# Patient Record
Sex: MALE | Race: WHITE | Employment: FULL TIME | URBAN - NONMETROPOLITAN AREA
[De-identification: names, ages, dates, MRNs, and addresses within clinical notes are randomized per-mention and may not be internally consistent; named-entity substitution may affect disease eponyms.]

---

## 2018-07-16 PROBLEM — N20.0 RENAL STONE: Status: ACTIVE | Noted: 2018-07-16

## 2022-03-18 PROBLEM — N20.0 RENAL STONE: Status: ACTIVE | Noted: 2018-07-16

## 2022-06-19 ENCOUNTER — APPOINTMENT (OUTPATIENT)
Dept: GENERAL RADIOLOGY | Age: 42
End: 2022-06-19
Attending: INTERNAL MEDICINE
Payer: COMMERCIAL

## 2022-06-19 ENCOUNTER — HOSPITAL ENCOUNTER (EMERGENCY)
Age: 42
Discharge: HOME | End: 2022-06-19
Attending: INTERNAL MEDICINE
Payer: COMMERCIAL

## 2022-06-19 VITALS
HEIGHT: 70 IN | HEART RATE: 105 BPM | OXYGEN SATURATION: 100 % | BODY MASS INDEX: 35.66 KG/M2 | TEMPERATURE: 98.1 F | RESPIRATION RATE: 18 BRPM | WEIGHT: 249.12 LBS | SYSTOLIC BLOOD PRESSURE: 129 MMHG | DIASTOLIC BLOOD PRESSURE: 65 MMHG

## 2022-06-19 DIAGNOSIS — J06.9 VIRAL UPPER RESPIRATORY TRACT INFECTION: Primary | ICD-10-CM

## 2022-06-19 DIAGNOSIS — J98.01 ACUTE BRONCHOSPASM: ICD-10-CM

## 2022-06-19 LAB
DEPRECATED S PYO AG THROAT QL EIA: NEGATIVE
FLUAV AG NPH QL IA: NEGATIVE
FLUBV AG NOSE QL IA: NEGATIVE

## 2022-06-19 PROCEDURE — 87147 CULTURE TYPE IMMUNOLOGIC: CPT

## 2022-06-19 PROCEDURE — 87804 INFLUENZA ASSAY W/OPTIC: CPT

## 2022-06-19 PROCEDURE — 99284 EMERGENCY DEPT VISIT MOD MDM: CPT

## 2022-06-19 PROCEDURE — 87070 CULTURE OTHR SPECIMN AEROBIC: CPT

## 2022-06-19 PROCEDURE — 74011000250 HC RX REV CODE- 250: Performed by: INTERNAL MEDICINE

## 2022-06-19 PROCEDURE — 87880 STREP A ASSAY W/OPTIC: CPT

## 2022-06-19 PROCEDURE — 71045 X-RAY EXAM CHEST 1 VIEW: CPT

## 2022-06-19 PROCEDURE — 74011250637 HC RX REV CODE- 250/637: Performed by: INTERNAL MEDICINE

## 2022-06-19 PROCEDURE — 94640 AIRWAY INHALATION TREATMENT: CPT

## 2022-06-19 RX ORDER — DEXAMETHASONE SODIUM PHOSPHATE 4 MG/ML
10 INJECTION, SOLUTION INTRA-ARTICULAR; INTRALESIONAL; INTRAMUSCULAR; INTRAVENOUS; SOFT TISSUE
Status: COMPLETED | OUTPATIENT
Start: 2022-06-19 | End: 2022-06-19

## 2022-06-19 RX ORDER — DEXTROMETHORPHAN POLISTIREX 30 MG/5 ML
SUSPENSION, EXTENDED RELEASE 12 HR ORAL AS NEEDED
Status: DISCONTINUED | OUTPATIENT
Start: 2022-06-19 | End: 2022-06-19 | Stop reason: CLARIF

## 2022-06-19 RX ORDER — CETIRIZINE HCL 10 MG
TABLET ORAL
Qty: 15 TABLET | Refills: 0 | Status: SHIPPED | OUTPATIENT
Start: 2022-06-19

## 2022-06-19 RX ORDER — DULOXETIN HYDROCHLORIDE 30 MG/1
CAPSULE, DELAYED RELEASE ORAL
COMMUNITY
Start: 2022-06-11

## 2022-06-19 RX ORDER — CARIPRAZINE 4.5 MG/1
4.5 CAPSULE, GELATIN COATED ORAL DAILY
COMMUNITY
Start: 2022-06-11

## 2022-06-19 RX ORDER — PREDNISONE 20 MG/1
20 TABLET ORAL DAILY
Qty: 5 TABLET | Refills: 0 | Status: SHIPPED | OUTPATIENT
Start: 2022-06-19 | End: 2022-06-24

## 2022-06-19 RX ORDER — ALBUTEROL SULFATE 0.83 MG/ML
2.5 SOLUTION RESPIRATORY (INHALATION)
Status: COMPLETED | OUTPATIENT
Start: 2022-06-19 | End: 2022-06-19

## 2022-06-19 RX ORDER — GUSELKUMAB 100 MG/ML
INJECTION SUBCUTANEOUS
COMMUNITY

## 2022-06-19 RX ORDER — TRAZODONE HYDROCHLORIDE 100 MG/1
TABLET ORAL
COMMUNITY
Start: 2022-06-11

## 2022-06-19 RX ORDER — ALBUTEROL SULFATE 90 UG/1
2 AEROSOL, METERED RESPIRATORY (INHALATION)
Qty: 1 EACH | Refills: 0 | Status: SHIPPED | OUTPATIENT
Start: 2022-06-19

## 2022-06-19 RX ORDER — ALPRAZOLAM 0.5 MG/1
0.5 TABLET ORAL DAILY
COMMUNITY
Start: 2022-06-11

## 2022-06-19 RX ORDER — IPRATROPIUM BROMIDE AND ALBUTEROL SULFATE 2.5; .5 MG/3ML; MG/3ML
3 SOLUTION RESPIRATORY (INHALATION)
Status: COMPLETED | OUTPATIENT
Start: 2022-06-19 | End: 2022-06-19

## 2022-06-19 RX ADMIN — ALBUTEROL SULFATE 2.5 MG: 2.5 SOLUTION RESPIRATORY (INHALATION) at 08:47

## 2022-06-19 RX ADMIN — DEXAMETHASONE SODIUM PHOSPHATE 10 MG: 4 INJECTION, SOLUTION INTRAMUSCULAR; INTRAVENOUS at 08:03

## 2022-06-19 RX ADMIN — IPRATROPIUM BROMIDE AND ALBUTEROL SULFATE 3 ML: .5; 2.5 SOLUTION RESPIRATORY (INHALATION) at 07:58

## 2022-06-19 NOTE — ED PROVIDER NOTES
EMERGENCY DEPARTMENT HISTORY AND PHYSICAL EXAM      Date: 6/19/2022  Patient Name: Letty Lynne      History of Presenting Illness     Chief Complaint   Patient presents with    Flu Like Symptoms       History Provided By: Patient    HPI: Letty Lynne, 38 y.o. male with a past medical history significant for bipolar; kidney stone; spina bifida; CHRISTIANO that presents to the ED with cc of URI sx for past 3 d with cough; runny nose; sinus congestion; wheezing. Pt vapes. Denies fever; chills; hemoptysis; n/v. There are no other complaints, changes, or physical findings at this time. PCP: Lurdes Mark NP    Current Outpatient Medications   Medication Sig Dispense Refill    albuterol (PROVENTIL HFA, VENTOLIN HFA, PROAIR HFA) 90 mcg/actuation inhaler Take 2 Puffs by inhalation every four (4) hours as needed for Wheezing. 1 Each 0    predniSONE (DELTASONE) 20 mg tablet Take 1 Tablet by mouth daily for 5 days. With Breakfast 5 Tablet 0    cetirizine (ZyrTEC) 10 mg tablet Take one tablets by mouth daily for 7 days and then as needed after that. Restart forseven days if sinus congestion recurs. 15 Tablet 0    guaiFENesin-dextromethorphan SR (Mucinex DM) 600-30 mg per tablet Take 1 Tablet by mouth two (2) times a day. 20 Tablet 0    traZODone (DESYREL) 100 mg tablet TAKE 1 TO 2 TABLETS BY MOUTH AT BEDTIME AS NEEDED FOR INSOMNIA      DULoxetine (CYMBALTA) 30 mg capsule TAKE 1 CAPSULE BY MOUTH IN THE MORNING      Vraylar 4.5 mg capsule Take 4.5 mg by mouth daily.  guselkumab (Tremfya) 100 mg/mL atIn INJECT 1 PEN UNDER THE SKIN EVERY 8 WEEKS.  ALPRAZolam (XANAX) 0.5 mg tablet Take 0.5 mg by mouth daily.          Past History     Past Medical History:  Past Medical History:   Diagnosis Date    Arthritis     Bipolar 1 disorder ([address]     Kidney stone     Psychiatric disorder     BIPOLAR    Sleep apnea     Sleep apnea     Spina bifida [facility])        Past Surgical History:  Past Surgical History:   Procedure Laterality Date    HX ORTHOPAEDIC  2013    [facility] -SPINE       Family History:  History reviewed. No pertinent family history. Social History:  Social History     Tobacco Use    Smoking status: Former Smoker     Types: Cigarettes     Quit date: 2013     Years since quittin.2    Smokeless tobacco: Never Used   Vaping Use    Vaping Use: Not on file   Substance Use Topics    Alcohol use: No     Alcohol/week: 0.0 standard drinks    Drug use: Yes     Types: Marijuana, Prescription       Allergies: Allergies   Allergen Reactions    Nsaids (Non-Steroidal Anti-Inflammatory Drug) Other (comments)     Swelling in the feet and hands         Review of Systems     Review of Systems   Constitutional: Negative for chills and fever. HENT: Positive for rhinorrhea and sinus pressure. Negative for sore throat. Eyes: Negative for redness. Respiratory: Positive for cough and wheezing. Negative for shortness of breath. Cardiovascular: Negative for chest pain. Gastrointestinal: Negative for abdominal pain, diarrhea, nausea and vomiting. Genitourinary: Negative for dysuria and flank pain. Musculoskeletal: Negative for arthralgias, myalgias and neck stiffness. Skin: Negative for rash. Neurological: Negative for headaches. Psychiatric/Behavioral: Negative for confusion. Physical Exam     Physical Exam  Vitals and nursing note reviewed. Constitutional:       General: He is not in acute distress. Appearance: Normal appearance. He is not ill-appearing or diaphoretic. HENT:      Head: Normocephalic. Nose: Congestion present. Mouth/Throat:      Pharynx: Oropharynx is clear. No oropharyngeal exudate or posterior oropharyngeal erythema. Eyes:      Conjunctiva/sclera: Conjunctivae normal.      Pupils: Pupils are equal, round, and reactive to light. Cardiovascular:      Rate and Rhythm: Regular rhythm. Pulses: Normal pulses.       Heart sounds: Normal heart sounds. No murmur heard. Pulmonary:      Effort: Pulmonary effort is normal. No respiratory distress. Breath sounds: Wheezing present. Abdominal:      Palpations: Abdomen is soft. Tenderness: There is no abdominal tenderness. There is no guarding or rebound. Musculoskeletal:         General: Normal range of motion. Cervical back: Neck supple. Skin:     General: Skin is warm. Neurological:      Mental Status: He is alert and oriented to person, place, and time. Psychiatric:         Behavior: Behavior normal.         Lab and Diagnostic Study Results     Labs -     Recent Results (from the past 12 hour(s))   INFLUENZA A & B AG (RAPID TEST)    Collection Time: 06/19/22  7:44 AM   Result Value Ref Range    Influenza A Antigen Negative Negative      Influenza B Antigen Negative Negative     STREP AG SCREEN, GROUP A    Collection Time: 06/19/22  7:44 AM    Specimen: Throat   Result Value Ref Range    Group A Strep Ag ID Negative         Radiologic Studies -   [unfilled]  CT Results  (Last 48 hours)    None        CXR Results  (Last 48 hours)    None          Medical Decision Making and ED Course   - I am the first and primary provider for this patient AND AM THE PRIMARY PROVIDER OF RECORD. - I reviewed the vital signs, available nursing notes, past medical history, past surgical history, family history and social history. - Initial assessment performed. The patients presenting problems have been discussed, and the staff are in agreement with the care plan formulated and outlined with them. I have encouraged them to ask questions as they arise throughout their visit. Vital Signs-Reviewed the patient's vital signs. Patient Vitals for the past 12 hrs:   Temp Pulse Resp BP SpO2   06/19/22 0848 -- -- -- -- 100 %   06/19/22 0820 -- -- -- -- 98 %   06/19/22 0748 -- -- -- -- 98 %   06/19/22 0742 98.1 °F (36.7 °C) 80 18 (!) 160/94 98 %           Records Reviewed:  Old Medical Records    ED Course:   DDX:  Including, but not limited to : URI, LRTI, viral syndrome, RAD, bronchospasm, influenza, seasonal allergies; pneumonia; and GERD. Consultations:       Consultations:      Procedures and Critical Care           Disposition     Disposition: Discharge    Remove if not discharged  DISCHARGE PLAN:  1. Current Discharge Medication List      CONTINUE these medications which have NOT CHANGED    Details   traZODone (DESYREL) 100 mg tablet TAKE 1 TO 2 TABLETS BY MOUTH AT BEDTIME AS NEEDED FOR INSOMNIA      DULoxetine (CYMBALTA) 30 mg capsule TAKE 1 CAPSULE BY MOUTH IN THE MORNING      Vraylar 4.5 mg capsule Take 4.5 mg by mouth daily. guselkumab (Tremfya) 100 mg/mL atIn INJECT 1 PEN UNDER THE SKIN EVERY 8 WEEKS. ALPRAZolam (XANAX) 0.5 mg tablet Take 0.5 mg by mouth daily. 2.   Follow-up Information     Follow up With Specialties Details Why Contact Info    Otto Bradford MD Family Medicine Schedule an appointment as soon as possible for a visit in 1 week  [address] [facility]  [address] [place] [address]  [phone]          3. Return to ED if worse   4. Current Discharge Medication List      START taking these medications    Details   albuterol (PROVENTIL HFA, VENTOLIN HFA, PROAIR HFA) 90 mcg/actuation inhaler Take 2 Puffs by inhalation every four (4) hours as needed for Wheezing. Qty: 1 Each, Refills: 0  Start date: 6/19/2022      predniSONE (DELTASONE) 20 mg tablet Take 1 Tablet by mouth daily for 5 days. With Breakfast  Qty: 5 Tablet, Refills: 0  Start date: 6/19/2022, End date: 6/24/2022      cetirizine (ZyrTEC) 10 mg tablet Take one tablets by mouth daily for 7 days and then as needed after that. Restart forseven days if sinus congestion recurs. Qty: 15 Tablet, Refills: 0  Start date: 6/19/2022      guaiFENesin-dextromethorphan SR (Mucinex DM) 600-30 mg per tablet Take 1 Tablet by mouth two (2) times a day.   Qty: 20 Tablet, Refills: 0  Start date: 6/19/2022             Diagnosis     Clinical Impression:   1. Viral upper respiratory tract infection    2. Acute bronchospasm        Attestations:    Ronnie Mcgill MD    Please note that this dictation was completed with [employer], the computer voice recognition software. Quite often unanticipated grammatical, syntax, homophones, and other interpretive errors are inadvertently transcribed by the computer software. Please disregard these errors. Please excuse any errors that have escaped final proofreading. Thank you.

## 2022-06-22 LAB
BACTERIA SPEC CULT: NORMAL
BACTERIA SPEC CULT: NORMAL
SPECIAL REQUESTS,SREQ: NORMAL

## 2023-04-27 ENCOUNTER — HOSPITAL ENCOUNTER (EMERGENCY)
Age: 43
Discharge: HOME | End: 2023-04-27
Attending: EMERGENCY MEDICINE
Payer: COMMERCIAL

## 2023-04-27 ENCOUNTER — APPOINTMENT (OUTPATIENT)
Age: 43
End: 2023-04-27
Payer: COMMERCIAL

## 2023-04-27 VITALS
WEIGHT: 255.3 LBS | DIASTOLIC BLOOD PRESSURE: 97 MMHG | TEMPERATURE: 97.8 F | OXYGEN SATURATION: 96 % | SYSTOLIC BLOOD PRESSURE: 151 MMHG | RESPIRATION RATE: 18 BRPM | HEART RATE: 72 BPM | HEIGHT: 69 IN | BODY MASS INDEX: 37.81 KG/M2

## 2023-04-27 DIAGNOSIS — M54.50 ACUTE EXACERBATION OF CHRONIC LOW BACK PAIN: Primary | ICD-10-CM

## 2023-04-27 DIAGNOSIS — G89.29 ACUTE EXACERBATION OF CHRONIC LOW BACK PAIN: Primary | ICD-10-CM

## 2023-04-27 PROCEDURE — 99283 EMERGENCY DEPT VISIT LOW MDM: CPT

## 2023-04-27 PROCEDURE — 72100 X-RAY EXAM L-S SPINE 2/3 VWS: CPT

## 2023-04-27 RX ORDER — METHYLPREDNISOLONE 4 MG/1
TABLET ORAL
Qty: 1 KIT | Refills: 0 | Status: SHIPPED | OUTPATIENT
Start: 2023-04-27 | End: 2023-05-03

## 2023-04-27 RX ORDER — HYDROCODONE BITARTRATE AND ACETAMINOPHEN 7.5; 325 MG/1; MG/1
1 TABLET ORAL EVERY 6 HOURS PRN
Qty: 5 TABLET | Refills: 0 | Status: SHIPPED | OUTPATIENT
Start: 2023-04-27 | End: 2023-04-30

## 2023-04-27 ASSESSMENT — PAIN DESCRIPTION - LOCATION: LOCATION: BACK

## 2023-04-27 ASSESSMENT — PAIN DESCRIPTION - DESCRIPTORS: DESCRIPTORS: ACHING;SHARP

## 2023-04-27 ASSESSMENT — PAIN - FUNCTIONAL ASSESSMENT
PAIN_FUNCTIONAL_ASSESSMENT: 0-10
PAIN_FUNCTIONAL_ASSESSMENT: 0-10

## 2023-04-27 ASSESSMENT — PAIN SCALES - GENERAL
PAINLEVEL_OUTOF10: 4
PAINLEVEL_OUTOF10: 5

## 2023-04-27 ASSESSMENT — PAIN DESCRIPTION - ORIENTATION: ORIENTATION: LOWER

## 2023-04-27 NOTE — ED NOTES
Discharge instructions reviewed with patient. Patient verbalized understanding. Patient advised to follow up as directed on discharge instructions. Patient denies questions, needs or concerns at this time. Patient verbalized understanding. No s/sx of distress noted.        Gisela Dave RN  04/27/23 2755

## 2023-04-27 NOTE — ED TRIAGE NOTES
Patient reports hx of spina bifida. States his lower back started bothering him yesterday. Patient states he tried taking Tylenol yesterday without relief. Pain is worse with movement.

## 2023-04-27 NOTE — ED PROVIDER NOTES
management. REASSESSMENT            FINAL IMPRESSION      1. Acute exacerbation of chronic low back pain          DISPOSITION/PLAN   DISPOSITION Decision To Discharge 04/27/2023 08:13:52 AM      PATIENT REFERRED TO:  No follow-up provider specified. DISCHARGE MEDICATIONS:  Discharge Medication List as of 4/27/2023  8:17 AM        START taking these medications    Details   HYDROcodone-acetaminophen (NORCO) 7.5-325 MG per tablet Take 1 tablet by mouth every 6 hours as needed for Pain for up to 3 days. Do not dispense without steroid. Max Daily Amount: 4 tablets, Disp-5 tablet, R-0Normal      methylPREDNISolone (MEDROL, DELMER,) 4 MG tablet Take by mouth., Disp-1 kit, R-0Normal           Controlled Substances Monitoring:     No flowsheet data found.     (Please note that portions of this note were completed with a voice recognition program.  Efforts were made to edit the dictations but occasionally words are mis-transcribed.)    An Pal MD (electronically signed)  Attending Emergency Physician           Helder Xie MD  04/27/23 5482

## 2023-05-03 ENCOUNTER — OFFICE VISIT (OUTPATIENT)
Facility: CLINIC | Age: 43
End: 2023-05-03
Payer: COMMERCIAL

## 2023-05-03 VITALS
OXYGEN SATURATION: 96 % | HEIGHT: 69 IN | SYSTOLIC BLOOD PRESSURE: 136 MMHG | TEMPERATURE: 98.1 F | RESPIRATION RATE: 18 BRPM | DIASTOLIC BLOOD PRESSURE: 79 MMHG | BODY MASS INDEX: 38.21 KG/M2 | WEIGHT: 258 LBS | HEART RATE: 84 BPM

## 2023-05-03 DIAGNOSIS — E66.09 CLASS 2 OBESITY DUE TO EXCESS CALORIES WITHOUT SERIOUS COMORBIDITY WITH BODY MASS INDEX (BMI) OF 38.0 TO 38.9 IN ADULT: ICD-10-CM

## 2023-05-03 DIAGNOSIS — Z11.4 SCREENING FOR HUMAN IMMUNODEFICIENCY VIRUS WITHOUT PRESENCE OF RISK FACTORS: ICD-10-CM

## 2023-05-03 DIAGNOSIS — Z13.1 SCREENING FOR DIABETES MELLITUS (DM): ICD-10-CM

## 2023-05-03 DIAGNOSIS — M62.838 MUSCLE SPASM: ICD-10-CM

## 2023-05-03 DIAGNOSIS — Z13.29 THYROID DISORDER SCREEN: ICD-10-CM

## 2023-05-03 DIAGNOSIS — Z11.59 NEED FOR HEPATITIS C SCREENING TEST: Primary | ICD-10-CM

## 2023-05-03 DIAGNOSIS — Z13.220 SCREENING, LIPID: ICD-10-CM

## 2023-05-03 PROCEDURE — 99204 OFFICE O/P NEW MOD 45 MIN: CPT | Performed by: FAMILY MEDICINE

## 2023-05-03 RX ORDER — TIZANIDINE 2 MG/1
2 TABLET ORAL 4 TIMES DAILY PRN
Qty: 40 TABLET | Refills: 0 | Status: SHIPPED | OUTPATIENT
Start: 2023-05-03 | End: 2023-05-13

## 2023-05-03 SDOH — ECONOMIC STABILITY: HOUSING INSECURITY
IN THE LAST 12 MONTHS, WAS THERE A TIME WHEN YOU DID NOT HAVE A STEADY PLACE TO SLEEP OR SLEPT IN A SHELTER (INCLUDING NOW)?: NO

## 2023-05-03 SDOH — ECONOMIC STABILITY: FOOD INSECURITY: WITHIN THE PAST 12 MONTHS, THE FOOD YOU BOUGHT JUST DIDN'T LAST AND YOU DIDN'T HAVE MONEY TO GET MORE.: NEVER TRUE

## 2023-05-03 SDOH — ECONOMIC STABILITY: FOOD INSECURITY: WITHIN THE PAST 12 MONTHS, YOU WORRIED THAT YOUR FOOD WOULD RUN OUT BEFORE YOU GOT MONEY TO BUY MORE.: NEVER TRUE

## 2023-05-03 SDOH — ECONOMIC STABILITY: INCOME INSECURITY: HOW HARD IS IT FOR YOU TO PAY FOR THE VERY BASICS LIKE FOOD, HOUSING, MEDICAL CARE, AND HEATING?: NOT HARD AT ALL

## 2023-05-03 ASSESSMENT — PATIENT HEALTH QUESTIONNAIRE - PHQ9
2. FEELING DOWN, DEPRESSED OR HOPELESS: 1
SUM OF ALL RESPONSES TO PHQ9 QUESTIONS 1 & 2: 2
SUM OF ALL RESPONSES TO PHQ QUESTIONS 1-9: 2
1. LITTLE INTEREST OR PLEASURE IN DOING THINGS: 1
SUM OF ALL RESPONSES TO PHQ QUESTIONS 1-9: 2

## 2023-05-03 ASSESSMENT — ENCOUNTER SYMPTOMS: BACK PAIN: 1

## 2023-05-03 NOTE — PROGRESS NOTES
Codi Small is a 37 y.o. male who presents to the office today for the following:  Chief Complaint   Patient presents with    New Patient    Follow-Up from Hospital    Back Pain       Allergies   Allergen Reactions    Nsaids Other (See Comments)     Swelling in the feet and hands         Current Outpatient Medications:     ALPRAZolam (XANAX) 0.5 MG tablet, Take 1 tablet by mouth daily. , Disp: , Rfl:     cariprazine hcl (VRAYLAR) 4.5 MG CAPS capsule, Take 1 capsule by mouth daily, Disp: , Rfl:     DULoxetine (CYMBALTA) 30 MG extended release capsule, TAKE 1 CAPSULE BY MOUTH IN THE MORNING, Disp: , Rfl:     Guselkumab (TREMFYA) 100 MG/ML SOPN, INJECT 1 PEN UNDER THE SKIN EVERY 8 WEEKS., Disp: , Rfl:     traZODone (DESYREL) 100 MG tablet, TAKE 1 TO 2 TABLETS BY MOUTH AT BEDTIME AS NEEDED FOR INSOMNIA, Disp: , Rfl:     methylPREDNISolone (MEDROL, DELMER,) 4 MG tablet, Take by mouth. (Patient not taking: Reported on 5/3/2023), Disp: 1 kit, Rfl: 0    albuterol sulfate HFA (PROVENTIL;VENTOLIN;PROAIR) 108 (90 Base) MCG/ACT inhaler, Inhale 2 puffs into the lungs every 4 hours as needed (Patient not taking: Reported on 4/27/2023), Disp: , Rfl:     cetirizine (ZYRTEC) 10 MG tablet, Take one tablets by mouth daily for 7 days and then as needed after that. Restart forseven days if sinus congestion recurs.  (Patient not taking: Reported on 4/27/2023), Disp: , Rfl:     dextromethorphan-guaiFENesin (MUCINEX DM)  MG per extended release tablet, Take 1 tablet by mouth 2 times daily (Patient not taking: Reported on 4/27/2023), Disp: , Rfl:       Past Medical History:   Diagnosis Date    Arthritis     Bipolar 1 disorder ([facility] [address]     Kidney stone     Psychiatric disorder     BIPOLAR    Sleep apnea     Sleep apnea     Spina bifida [facility])        Past Surgical History:   Procedure Laterality Date    ORTHOPEDIC SURGERY  2013    [facility]AN[facility] BLOCKERS -SPINE       Social History     Socioeconomic History    Marital status:      Spouse

## 2024-04-10 ENCOUNTER — OFFICE VISIT (OUTPATIENT)
Facility: CLINIC | Age: 44
End: 2024-04-10
Payer: COMMERCIAL

## 2024-04-10 VITALS
SYSTOLIC BLOOD PRESSURE: 131 MMHG | BODY MASS INDEX: 37.83 KG/M2 | WEIGHT: 255.4 LBS | HEART RATE: 111 BPM | DIASTOLIC BLOOD PRESSURE: 86 MMHG | TEMPERATURE: 98.1 F | OXYGEN SATURATION: 96 % | RESPIRATION RATE: 16 BRPM | HEIGHT: 69 IN

## 2024-04-10 DIAGNOSIS — G47.33 OBSTRUCTIVE SLEEP APNEA SYNDROME: ICD-10-CM

## 2024-04-10 DIAGNOSIS — E66.09 CLASS 2 OBESITY DUE TO EXCESS CALORIES WITHOUT SERIOUS COMORBIDITY WITH BODY MASS INDEX (BMI) OF 37.0 TO 37.9 IN ADULT: ICD-10-CM

## 2024-04-10 DIAGNOSIS — Z13.1 SCREENING FOR DIABETES MELLITUS: ICD-10-CM

## 2024-04-10 DIAGNOSIS — Z87.891 HISTORY OF TOBACCO USE: ICD-10-CM

## 2024-04-10 DIAGNOSIS — R00.0 TACHYCARDIA: ICD-10-CM

## 2024-04-10 DIAGNOSIS — I10 PRIMARY HYPERTENSION: Primary | ICD-10-CM

## 2024-04-10 DIAGNOSIS — Z13.220 SCREENING FOR CHOLESTEROL LEVEL: ICD-10-CM

## 2024-04-10 PROCEDURE — 3079F DIAST BP 80-89 MM HG: CPT | Performed by: NURSE PRACTITIONER

## 2024-04-10 PROCEDURE — 99203 OFFICE O/P NEW LOW 30 MIN: CPT | Performed by: NURSE PRACTITIONER

## 2024-04-10 PROCEDURE — 3075F SYST BP GE 130 - 139MM HG: CPT | Performed by: NURSE PRACTITIONER

## 2024-04-10 RX ORDER — CLONIDINE HYDROCHLORIDE 0.2 MG/1
0.2 TABLET ORAL NIGHTLY
COMMUNITY

## 2024-04-10 RX ORDER — CLONAZEPAM 2 MG/1
2 TABLET ORAL NIGHTLY PRN
COMMUNITY

## 2024-04-10 ASSESSMENT — PATIENT HEALTH QUESTIONNAIRE - PHQ9
SUM OF ALL RESPONSES TO PHQ QUESTIONS 1-9: 0
2. FEELING DOWN, DEPRESSED OR HOPELESS: NOT AT ALL
SUM OF ALL RESPONSES TO PHQ QUESTIONS 1-9: 0
SUM OF ALL RESPONSES TO PHQ9 QUESTIONS 1 & 2: 0
1. LITTLE INTEREST OR PLEASURE IN DOING THINGS: NOT AT ALL
SUM OF ALL RESPONSES TO PHQ QUESTIONS 1-9: 0
SUM OF ALL RESPONSES TO PHQ QUESTIONS 1-9: 0

## 2024-04-10 NOTE — PROGRESS NOTES
Chief Complaint   Patient presents with    New Patient    Establish Care       \"Have you been to the ER, urgent care clinic since your last visit?  Hospitalized since your last visit?\"    NO    “Have you seen or consulted any other health care providers outside of [facility] since your last visit?”    NO    “Have you had a colorectal cancer screening such as a colonoscopy/FIT/Cologuard?    NO    No colonoscopy on file  No cologuard on file  No FIT/FOBT on file   No flexible sigmoidoscopy on file             
ear pain,postnasal drip, rhinitis,  Eyes:  Negative for visual disturbance,   Respiratory:  Negative for cough and shortness of breath,wheezing ,congestion  Cardiovascular:  Negative for chest pain, palpitations and leg swelling.   Gastrointestinal:  Negative for abdominal pain, constipation, diarrhea, nausea ,vomiting.   Musculoskeletal:  Negative for arthralgias, back pain and gait problem. Joint pain, muscle pain  Skin:  Negative for rash, lesions,  Neurological:  Negative for fainting,dizziness, weakness, headaches,numbness       Current Outpatient Medications   Medication Sig    clonazePAM (KLONOPIN) 2 MG tablet Take 1 tablet by mouth nightly as needed. Max Daily Amount: 2 mg    cloNIDine (CATAPRES) 0.2 MG tablet Take 1 tablet by mouth nightly    cariprazine hcl (VRAYLAR) 4.5 MG CAPS capsule Take 1 capsule by mouth daily    DULoxetine (CYMBALTA) 30 MG extended release capsule TAKE 1 CAPSULE BY MOUTH IN THE MORNING    Guselkumab (TREMFYA) 100 MG/ML SOPN INJECT 1 PEN UNDER THE SKIN EVERY 8 WEEKS.    traZODone (DESYREL) 100 MG tablet TAKE 1 TO 2 TABLETS BY MOUTH AT BEDTIME AS NEEDED FOR INSOMNIA     No current facility-administered medications for this visit.       /86 (Site: Left Upper Arm, Position: Sitting, Cuff Size: Small Adult)   Pulse (!) 111   Temp 98.1 °F (36.7 °C) (Oral)   Resp 16   Ht 1.753 m (5' 9\")   Wt 115.8 kg (255 lb 6.4 oz)   SpO2 96%   BMI 37.72 kg/m²            General:  alert, cooperative, well appearing, in no apparent distress.  Eyes:  Pupils are equally round and reactive to light with accommodation.  The extra-ocular movements are intact.  The lids are without swelling, lesions, or drainage.  The conjunctiva is clear and noninjected.  ENT:    The tongue and mucous membranes are pink and moist without lesions.  The pharynx is non-erythematous without exudates.  Neck:  There is normal range of motion.  The thyroid is normal size without nodules or masses.  There is no

## 2024-04-11 ENCOUNTER — HOSPITAL ENCOUNTER (OUTPATIENT)
Age: 44
End: 2024-04-11
Payer: COMMERCIAL

## 2024-04-11 ENCOUNTER — HOSPITAL ENCOUNTER (OUTPATIENT)
Age: 44
Discharge: HOME | End: 2024-04-11
Payer: COMMERCIAL

## 2024-04-11 DIAGNOSIS — R00.0 TACHYCARDIA: ICD-10-CM

## 2024-04-11 DIAGNOSIS — I10 PRIMARY HYPERTENSION: ICD-10-CM

## 2024-04-11 DIAGNOSIS — Z87.891 HISTORY OF TOBACCO USE: ICD-10-CM

## 2024-04-11 DIAGNOSIS — Z13.1 SCREENING FOR DIABETES MELLITUS: ICD-10-CM

## 2024-04-11 DIAGNOSIS — E66.09 CLASS 2 OBESITY DUE TO EXCESS CALORIES WITHOUT SERIOUS COMORBIDITY WITH BODY MASS INDEX (BMI) OF 37.0 TO 37.9 IN ADULT: ICD-10-CM

## 2024-04-11 DIAGNOSIS — Z13.220 SCREENING FOR CHOLESTEROL LEVEL: ICD-10-CM

## 2024-04-11 LAB
ALBUMIN SERPL-MCNC: 4.3 G/DL (ref 3.4–5)
ALBUMIN/GLOB SERPL: 1.3 (ref 0.8–1.7)
ALP SERPL-CCNC: 73 U/L (ref 45–117)
ALT SERPL-CCNC: 38 U/L (ref 16–61)
ANION GAP SERPL CALC-SCNC: 6 MMOL/L (ref 3–18)
AST SERPL W P-5'-P-CCNC: 21 U/L (ref 10–38)
BILIRUB SERPL-MCNC: 0.9 MG/DL (ref 0.2–1)
BUN SERPL-MCNC: 17 MG/DL (ref 7–18)
BUN/CREAT SERPL: 15 (ref 12–20)
CA-I BLD-MCNC: 9.5 MG/DL (ref 8.5–10.1)
CHLORIDE SERPL-SCNC: 103 MMOL/L (ref 100–111)
CHOLEST SERPL-MCNC: 125 MG/DL
CO2 SERPL-SCNC: 31 MMOL/L (ref 21–32)
CREAT SERPL-MCNC: 1.1 MG/DL (ref 0.6–1.3)
EKG ATRIAL RATE: 86 BPM
EKG DIAGNOSIS: NORMAL
EKG P AXIS: 66 DEGREES
EKG P-R INTERVAL: 162 MS
EKG Q-T INTERVAL: 398 MS
EKG QRS DURATION: 100 MS
EKG QTC CALCULATION (BAZETT): 476 MS
EKG R AXIS: 30 DEGREES
EKG T AXIS: 14 DEGREES
EKG VENTRICULAR RATE: 86 BPM
ERYTHROCYTE [DISTWIDTH] IN BLOOD BY AUTOMATED COUNT: 12.4 % (ref 11.6–14.5)
EST. AVERAGE GLUCOSE BLD GHB EST-MCNC: 114 MG/DL
GLOBULIN SER CALC-MCNC: 3.3 G/DL (ref 2–4)
GLUCOSE SERPL-MCNC: 122 MG/DL (ref 74–99)
HBA1C MFR BLD: 5.6 % (ref 4.2–5.6)
HCT VFR BLD AUTO: 47.4 % (ref 36–48)
HDLC SERPL-MCNC: 40 MG/DL (ref 40–60)
HDLC SERPL: 3.1 (ref 0–5)
HGB BLD-MCNC: 16.1 G/DL (ref 13–16)
LDLC SERPL CALC-MCNC: 60 MG/DL (ref 0–100)
LIPID PANEL: NORMAL
MCH RBC QN AUTO: 29.1 PG (ref 24–34)
MCHC RBC AUTO-ENTMCNC: 34 G/DL (ref 31–37)
MCV RBC AUTO: 85.7 FL (ref 78–100)
NRBC # BLD: 0 K/UL (ref 0–0.01)
NRBC BLD-RTO: 0 PER 100 WBC
PLATELET # BLD AUTO: 235 K/UL (ref 135–420)
PMV BLD AUTO: 9.4 FL (ref 9.2–11.8)
POTASSIUM SERPL-SCNC: 3.9 MMOL/L (ref 3.5–5.5)
PROT SERPL-MCNC: 7.6 G/DL (ref 6.4–8.2)
RBC # BLD AUTO: 5.53 M/UL (ref 4.35–5.65)
SODIUM SERPL-SCNC: 140 MMOL/L (ref 136–145)
TRIGL SERPL-MCNC: 125 MG/DL
TSH SERPL DL<=0.05 MIU/L-ACNC: 2.65 UIU/ML (ref 0.36–3.74)
VLDLC SERPL CALC-MCNC: 25 MG/DL
WBC # BLD AUTO: 7.3 K/UL (ref 4.6–13.2)

## 2024-04-11 PROCEDURE — 84443 ASSAY THYROID STIM HORMONE: CPT

## 2024-04-11 PROCEDURE — 80053 COMPREHEN METABOLIC PANEL: CPT

## 2024-04-11 PROCEDURE — 93005 ELECTROCARDIOGRAM TRACING: CPT

## 2024-04-11 PROCEDURE — 80061 LIPID PANEL: CPT

## 2024-04-11 PROCEDURE — 71046 X-RAY EXAM CHEST 2 VIEWS: CPT

## 2024-04-11 PROCEDURE — 85027 COMPLETE CBC AUTOMATED: CPT

## 2024-04-11 PROCEDURE — 36415 COLL VENOUS BLD VENIPUNCTURE: CPT

## 2024-04-11 PROCEDURE — 83036 HEMOGLOBIN GLYCOSYLATED A1C: CPT

## 2024-04-16 ENCOUNTER — TELEPHONE (OUTPATIENT)
Facility: CLINIC | Age: 44
End: 2024-04-16

## 2024-04-16 NOTE — TELEPHONE ENCOUNTER
Pt called to let kylee know that ( dr. Massey ) will call her Thursday to update her on pt. He was told to let kylee know that doctor will all her Thursday this week.

## 2024-04-18 ENCOUNTER — TELEPHONE (OUTPATIENT)
Facility: CLINIC | Age: 44
End: 2024-04-18

## 2024-04-18 NOTE — TELEPHONE ENCOUNTER
Dr. Mahon called to spk to kylee about pt - office  #[phone] / [phone] cell #  She wanting to dicuss more about his form that was sent to her/ transportation req. Has additional questions. Inform her provider out sick and not in office. Please call back.

## 2024-04-22 ENCOUNTER — TELEPHONE (OUTPATIENT)
Facility: CLINIC | Age: 44
End: 2024-04-22

## 2024-04-22 NOTE — TELEPHONE ENCOUNTER
Juani PATTERSON - called from occupational health [facility]. [phone] number- they did a DOT on pt w/ Dr. Russ- needing a letter of jonna from last seen. Pleases call PA. They all had a few questions as well.

## 2024-04-23 ENCOUNTER — TELEPHONE (OUTPATIENT)
Facility: CLINIC | Age: 44
End: 2024-04-23

## 2024-04-23 NOTE — TELEPHONE ENCOUNTER
Called and Juani is out of office today, states that they will look into patient chart and see what is needed and call me back.

## 2024-04-24 ENCOUNTER — TELEPHONE (OUTPATIENT)
Facility: CLINIC | Age: 44
End: 2024-04-24

## 2024-04-24 DIAGNOSIS — I10 PRIMARY HYPERTENSION: Primary | ICD-10-CM

## 2024-04-24 RX ORDER — LISINOPRIL 20 MG/1
20 TABLET ORAL DAILY
Qty: 30 TABLET | Refills: 0 | Status: SHIPPED | OUTPATIENT
Start: 2024-04-24

## 2024-04-24 RX ORDER — LISINOPRIL 20 MG/1
20 TABLET ORAL DAILY
COMMUNITY
End: 2024-04-24 | Stop reason: SDUPTHER

## 2024-04-24 NOTE — TELEPHONE ENCOUNTER
----- Message from Ashley Rinner sent at 4/24/2024  8:11 AM EDT -----  Subject: Medication Problem     Medication: lisinopril  Dosage: 20MG once in mornings  Ordering Provider: Elly Mccrary upped the dose from urgent care   prescription    Question/Problem: Pt says PCP bumped his dose from 10MG to 20MG but his BP   is still high (not below 140/90 still (it was 152/90 last night). Pt wants   to know if PCP wants to continue this Rx since Pt only has 3 pills left   and will run out on 4/26. Please advise.      Pharmacy: [facility] [address] [place] [address] [facility] -    [phone] - F [phone]    ---------------------------------------------------------------------------  --------------  CALL BACK INFO  [phone]; OK to leave message on voicemail  ---------------------------------------------------------------------------  --------------    SCRIPT ANSWERS  Relationship to Patient: Self

## 2024-04-24 NOTE — TELEPHONE ENCOUNTER
----- Message from Ashley Rinner sent at 4/24/2024  8:08 AM EDT -----  Subject: Message to Provider    QUESTIONS  Information for Provider? Pt wants PCP to know that Dr. Barrios or another   doctor (Dr. Barrios who has been trying to call PCP recently is out of office   today) at the [facility] does drug   screenings/physicals) office. This office has questions about Pt's BP that   is still high (it was 152/90 last night) and the BP Rx PCP wrote for Pt.   Pt wants PCP office to be aware they are going to call or call them   directly at [phone] if possible.   ---------------------------------------------------------------------------  --------------  CALL BACK INFO  [phone]; OK to leave message on voicemail  ---------------------------------------------------------------------------  --------------  SCRIPT ANSWERS  Relationship to Patient? Self

## 2024-04-29 ENCOUNTER — TELEMEDICINE (OUTPATIENT)
Facility: CLINIC | Age: 44
End: 2024-04-29

## 2024-04-29 ENCOUNTER — TELEPHONE (OUTPATIENT)
Facility: CLINIC | Age: 44
End: 2024-04-29

## 2024-04-29 DIAGNOSIS — I10 PRIMARY HYPERTENSION: Primary | ICD-10-CM

## 2024-04-29 DIAGNOSIS — E66.09 CLASS 2 OBESITY DUE TO EXCESS CALORIES WITHOUT SERIOUS COMORBIDITY WITH BODY MASS INDEX (BMI) OF 37.0 TO 37.9 IN ADULT: ICD-10-CM

## 2024-04-29 PROBLEM — R00.0 TACHYCARDIA: Status: ACTIVE | Noted: 2024-04-29

## 2024-04-29 NOTE — PROGRESS NOTES
Asa Lopez, was evaluated through a synchronous (real-time) audio-video encounter. The patient (or guardian if applicable) is aware that this is a billable service, which includes applicable co-pays. This Virtual Visit was conducted with patient's (and/or legal guardian's) consent. Patient identification was verified, and a caregiver was present when appropriate.   The patient was located at Home: [address] [place] [address]  Provider was located at Facility (Appt Dept): [address] [place] [address]  Confirm you are appropriately licensed, registered, or certified to deliver care in the state where the patient is located as indicated above. If you are not or unsure, please re-schedule the visit: Yes, I confirm.     Asa Lopez (:  1980) is a Established patient, presenting virtually for evaluation of the following:    Assessment & Plan   Below is the assessment and plan developed based on review of pertinent history, physical exam, labs, studies, and medications.    Labs are stable discussed with patient  EKG was stable  Chest x-ray stable        1. Primary hypertension  Assessment & Plan:   Patient should have nurse visit with machine to evaluate blood pressure before we make further decisions about additional medications  Continue lisinopril 20  Patient does take clonidine 0.2 at night for insomnia and sleep/none related to blood pressure  2. Class 2 obesity due to excess calories without serious comorbidity with body mass index (BMI) of 37.0 to 37.9 in adult    Return in about 2 days (around 2024), or nurse visit with blood pressure.       Subjective   HPI      HPI: 44-year-old male presents to the office to evaluate lab work    Pt is taking his blood pressures at home and reports numbers higher than 140/90   Examples such as  164/88, 171/94.  The patient does states his heart rates seem to be in the 80s most of the time    Lab stable mildly elevated hemoglobin, patient

## 2024-04-29 NOTE — ASSESSMENT & PLAN NOTE
Patient should have nurse visit with machine to evaluate blood pressure before we make further decisions about additional medications  Continue lisinopril 20  Patient does take clonidine 0.2 at night for insomnia and sleep/none related to blood pressure

## 2024-04-29 NOTE — PROGRESS NOTES
No chief complaint on file.      \"Have you been to the ER, urgent care clinic since your last visit?  Hospitalized since your last visit?\"    NO    “Have you seen or consulted any other health care providers outside of [facility] since your last visit?”    NO    “Have you had a colorectal cancer screening such as a colonoscopy/FIT/Cologuard?    NO    No colonoscopy on file  No cologuard on file  No FIT/FOBT on file   No flexible sigmoidoscopy on file

## 2024-04-30 ENCOUNTER — NURSE ONLY (OUTPATIENT)
Facility: CLINIC | Age: 44
End: 2024-04-30

## 2024-04-30 VITALS
SYSTOLIC BLOOD PRESSURE: 127 MMHG | OXYGEN SATURATION: 93 % | DIASTOLIC BLOOD PRESSURE: 84 MMHG | RESPIRATION RATE: 18 BRPM | HEART RATE: 101 BPM

## 2024-04-30 NOTE — PROGRESS NOTES
Nurse visit for BP check    BP check, patient brought BP log BP's at home running 152/90 and 199/117, Patient brought home BP machine for check. BP on home machine shows 144/87.   \"Have you been to the ER, urgent care clinic since your last visit?  Hospitalized since your last visit?\"    NO    “Have you seen or consulted any other health care providers outside of [facility] since your last visit?”    NO    “Have you had a colorectal cancer screening such as a colonoscopy/FIT/Cologuard?    NO

## 2024-10-02 ENCOUNTER — COMMUNITY OUTREACH (OUTPATIENT)
Facility: CLINIC | Age: 44
End: 2024-10-02

## 2025-03-17 ENCOUNTER — COMMUNITY OUTREACH (OUTPATIENT)
Facility: CLINIC | Age: 45
End: 2025-03-17